# Patient Record
Sex: FEMALE | Race: WHITE | URBAN - METROPOLITAN AREA
[De-identification: names, ages, dates, MRNs, and addresses within clinical notes are randomized per-mention and may not be internally consistent; named-entity substitution may affect disease eponyms.]

---

## 2021-08-23 ENCOUNTER — EMERGENCY (EMERGENCY)
Facility: HOSPITAL | Age: 41
LOS: 1 days | Discharge: HOME | End: 2021-08-23
Payer: COMMERCIAL

## 2021-08-23 VITALS
DIASTOLIC BLOOD PRESSURE: 91 MMHG | SYSTOLIC BLOOD PRESSURE: 134 MMHG | TEMPERATURE: 98 F | HEART RATE: 77 BPM | RESPIRATION RATE: 18 BRPM | OXYGEN SATURATION: 100 %

## 2021-08-23 LAB — SARS-COV-2 RNA SPEC QL NAA+PROBE: SIGNIFICANT CHANGE UP

## 2021-08-23 PROCEDURE — 99282 EMERGENCY DEPT VISIT SF MDM: CPT

## 2021-08-23 PROCEDURE — U0003: CPT

## 2021-08-23 PROCEDURE — U0005: CPT

## 2021-08-23 PROCEDURE — 99283 EMERGENCY DEPT VISIT LOW MDM: CPT

## 2021-08-23 NOTE — ED STATDOCS - PATIENT PORTAL LINK FT
You can access the FollowMyHealth Patient Portal offered by [facility] by registering at the following website: [email or web address]. By joining [employer]’s FollowMyHealth portal, you will also be able to view your health information using other applications (apps) compatible with our system.

## 2021-08-23 NOTE — ED STATDOCS - NS ED ROS FT
General: no fever or chills  Eyes: no redness or discharge  ENT: no nasal congestion, no sore throat  Cardiac: no CP, no palpitations  Respiratory: No cough, SOB, PETERSEN, wheeze  Abd: no abd pain, N/V/D  Skin: no itch or rash

## 2021-09-11 ENCOUNTER — EMERGENCY (EMERGENCY)
Facility: HOSPITAL | Age: 41
LOS: 1 days | Discharge: HOME | End: 2021-09-11
Payer: COMMERCIAL

## 2021-09-11 VITALS
RESPIRATION RATE: 16 BRPM | TEMPERATURE: 98 F | OXYGEN SATURATION: 99 % | DIASTOLIC BLOOD PRESSURE: 86 MMHG | SYSTOLIC BLOOD PRESSURE: 135 MMHG | HEART RATE: 86 BPM

## 2021-09-11 LAB — SARS-COV-2 RNA SPEC QL NAA+PROBE: SIGNIFICANT CHANGE UP

## 2021-09-11 PROCEDURE — 99283 EMERGENCY DEPT VISIT LOW MDM: CPT

## 2021-09-11 PROCEDURE — 99282 EMERGENCY DEPT VISIT SF MDM: CPT

## 2021-09-11 PROCEDURE — U0005: CPT

## 2021-09-11 PROCEDURE — U0003: CPT

## 2021-09-11 NOTE — ED PROVIDER NOTE - OBJECTIVE STATEMENT
PT presents for COVID testing. Denies any symptoms. No known + COVID contact reported. Needs swab to visit at hospital

## 2021-09-11 NOTE — ED PROVIDER NOTE - CLINICAL SUMMARY MEDICAL DECISION MAKING FREE TEXT BOX
Pt presenting for COVID testing  -Pt does not meet current COVID-19 criteria listed in most updated guidelines as per [facility] protocol/algorithm for admission at this time   -Lengthy discussion with patient regarding home quarantine, follow up with PMD, anticipatory guidance provided and supportive care recommended. Advised immediate return if worsening symptoms, strict return precautions, especially if short of breath, chest pain, inability to tolerate food/liquid. Pt given pre-printed [facility] Novel Coronavirus (COVID19) information packet which contains instructions on time frame of result and how to obtain. Pt verbalized understanding and agreement of plan.

## 2021-09-11 NOTE — ED PROVIDER NOTE - PATIENT PORTAL LINK FT
You can access the FollowMyHealth Patient Portal offered by [facility] by registering at the following website: [email or web address]. By joining [employer]’s FollowMyHealth portal, you will also be able to view your health information using other applications (apps) compatible with our system.

## 2021-11-08 ENCOUNTER — EMERGENCY (EMERGENCY)
Facility: HOSPITAL | Age: 41
LOS: 1 days | Discharge: HOME | End: 2021-11-08
Attending: EMERGENCY MEDICINE | Admitting: EMERGENCY MEDICINE
Payer: COMMERCIAL

## 2021-11-08 VITALS
DIASTOLIC BLOOD PRESSURE: 114 MMHG | WEIGHT: 190.04 LBS | OXYGEN SATURATION: 97 % | TEMPERATURE: 99 F | SYSTOLIC BLOOD PRESSURE: 164 MMHG | HEIGHT: 65 IN | RESPIRATION RATE: 18 BRPM | HEART RATE: 85 BPM

## 2021-11-08 DIAGNOSIS — Z98.891 HISTORY OF UTERINE SCAR FROM PREVIOUS SURGERY: Chronic | ICD-10-CM

## 2021-11-08 LAB
BASOPHILS # BLD AUTO: 0.03 K/UL — SIGNIFICANT CHANGE UP (ref 0–0.2)
BASOPHILS NFR BLD AUTO: 0.3 % — SIGNIFICANT CHANGE UP (ref 0–2)
D DIMER BLD IA.RAPID-MCNC: 153 NG/ML DDU — SIGNIFICANT CHANGE UP
EOSINOPHIL # BLD AUTO: 0.1 K/UL — SIGNIFICANT CHANGE UP (ref 0–0.5)
EOSINOPHIL NFR BLD AUTO: 1 % — SIGNIFICANT CHANGE UP (ref 0–6)
HCG UR QL: NEGATIVE — SIGNIFICANT CHANGE UP
HCT VFR BLD CALC: 35.8 % — SIGNIFICANT CHANGE UP (ref 34.5–45)
HGB BLD-MCNC: 11.2 G/DL — LOW (ref 11.5–15.5)
IMM GRANULOCYTES NFR BLD AUTO: 0.2 % — SIGNIFICANT CHANGE UP (ref 0–1.5)
LYMPHOCYTES # BLD AUTO: 3.74 K/UL — HIGH (ref 1–3.3)
LYMPHOCYTES # BLD AUTO: 39 % — SIGNIFICANT CHANGE UP (ref 13–44)
MCHC RBC-ENTMCNC: 24.8 PG — LOW (ref 27–34)
MCHC RBC-ENTMCNC: 31.3 GM/DL — LOW (ref 32–36)
MCV RBC AUTO: 79.4 FL — LOW (ref 80–100)
MONOCYTES # BLD AUTO: 0.69 K/UL — SIGNIFICANT CHANGE UP (ref 0–0.9)
MONOCYTES NFR BLD AUTO: 7.2 % — SIGNIFICANT CHANGE UP (ref 2–14)
NEUTROPHILS # BLD AUTO: 5.01 K/UL — SIGNIFICANT CHANGE UP (ref 1.8–7.4)
NEUTROPHILS NFR BLD AUTO: 52.3 % — SIGNIFICANT CHANGE UP (ref 43–77)
NRBC # BLD: 0 /100 WBCS — SIGNIFICANT CHANGE UP (ref 0–0)
PLATELET # BLD AUTO: 270 K/UL — SIGNIFICANT CHANGE UP (ref 150–400)
RBC # BLD: 4.51 M/UL — SIGNIFICANT CHANGE UP (ref 3.8–5.2)
RBC # FLD: 16.4 % — HIGH (ref 10.3–14.5)
WBC # BLD: 9.59 K/UL — SIGNIFICANT CHANGE UP (ref 3.8–10.5)
WBC # FLD AUTO: 9.59 K/UL — SIGNIFICANT CHANGE UP (ref 3.8–10.5)

## 2021-11-08 PROCEDURE — 93010 ELECTROCARDIOGRAM REPORT: CPT

## 2021-11-08 PROCEDURE — 99285 EMERGENCY DEPT VISIT HI MDM: CPT

## 2021-11-08 RX ORDER — ASPIRIN/CALCIUM CARB/MAGNESIUM 324 MG
162 TABLET ORAL ONCE
Refills: 0 | Status: COMPLETED | OUTPATIENT
Start: 2021-11-08 | End: 2021-11-08

## 2021-11-08 RX ADMIN — Medication 162 MILLIGRAM(S): at 23:35

## 2021-11-08 NOTE — ED PROVIDER NOTE - NSFOLLOWUPINSTRUCTIONS_ED_ALL_ED_FT
Please call tomorrow to make an appointment with your primary care doctor as listed on your insurance card.      Hypertension    WHAT YOU NEED TO KNOW:    Hypertension is high blood pressure. Your blood pressure is the force of your blood moving against the walls of your arteries. Hypertension causes your blood pressure to get so high that your heart has to work much harder than normal. This can damage your heart. The cause of hypertension may not be known. This is called essential or primary hypertension. Hypertension caused by another medical condition, such as kidney disease, is called secondary hypertension.    DISCHARGE INSTRUCTIONS:    Call your local emergency number (911 in the ) or have someone call if:   •You have chest pain.      •You have any of the following signs of a heart attack: ?Squeezing, pressure, or pain in your chest      ?You may also have any of the following: ?Discomfort or pain in your back, neck, jaw, stomach, or arm      ?Shortness of breath      ?Nausea or vomiting      ?Lightheadedness or a sudden cold sweat        •You become confused or have trouble speaking.      •You suddenly feel lightheaded or have trouble breathing.      Return to the emergency department if:   •You have a severe headache or vision loss.      •You have weakness in an arm or leg.      Call your doctor or cardiologist if:   •You feel faint, dizzy, confused, or drowsy.      •You have been taking your blood pressure medicine but your pressure is higher than your provider says it should be.      •You have questions or concerns about your condition or care.      Medicines: You may need any of the following:  •Antihypertensives may be used to help lower your blood pressure. Several kinds of medicines are available. Your healthcare provider will choose medicines based on the kind of hypertension you have. You may need more than one type of medicine. Take the medicine exactly as directed.      •Diuretics help decrease extra fluid that collects in your body. This will help lower your blood pressure. You may urinate more often while you take this medicine.      •Cholesterol medicine helps lower your cholesterol level. A low cholesterol level helps prevent heart disease and makes it easier to control your blood pressure.      •Take your medicine as directed. Contact your healthcare provider if you think your medicine is not helping or if you have side effects. Tell him or her if you are allergic to any medicine. Keep a list of the medicines, vitamins, and herbs you take. Include the amounts, and when and why you take them. Bring the list or the pill bottles to follow-up visits. Carry your medicine list with you in case of an emergency.      Follow up with your doctor or cardiologist as directed: You will need to return to have your blood pressure checked and to have other lab tests done. Write down your questions so you remember to ask them during your visits.    Stages of hypertension:     Blood Pressure Readings     •Normal blood pressure is 119/79 or lower. Your healthcare provider may only check your blood pressure each year if it stays at a normal level.      •Elevated blood pressure is 120/79 to 129/79. This is sometimes called prehypertension. Your healthcare provider may suggest lifestyle changes to help lower your blood pressure to a normal level. He or she may then check it again in 3 to 6 months.      •Stage 1 hypertension is 130/80 to 139/89. Your provider may recommend lifestyle changes, medication, and checks every 3 to 6 months until your blood pressure is controlled.      •Stage 2 hypertension is 140/90 or higher. Your provider will recommend lifestyle changes and have you take 2 kinds of hypertension medicines. You will also need to have your blood pressure checked monthly until it is controlled.      Manage hypertension:   •Check your blood pressure at home. Avoid smoking, caffeine, and exercise at least 30 minutes before checking your blood pressure. Sit and rest for 5 minutes before you take your blood pressure. Extend your arm and support it on a flat surface. Your arm should be at the same level as your heart. Follow the directions that came with your blood pressure monitor. Check your blood pressure 2 times, 1 minute apart, before you take your medicine in the morning. Also check your blood pressure before your evening meal. Keep a record of your readings and bring it to your follow-up visits. Ask your healthcare provider what your blood pressure should be.  How to take a Blood Pressure           •Manage any other health conditions you have. Health conditions such as diabetes can increase your risk for hypertension. Follow your healthcare provider's instructions and take all your medicines as directed.      •Ask about all medicines. Certain medicines can increase your blood pressure. Examples include oral birth control pills, decongestants, herbal supplements, and NSAIDs, such as ibuprofen. Your healthcare provider can tell you which medicines are safe for you to take. This includes prescription and over-the-counter medicines.      Lifestyle changes you can make to manage hypertension: Your healthcare provider may recommend you work with a team to manage hypertension. The team may include medical experts such as a dietitian, an exercise or physical therapist, and a behavior therapist. Your family members may be included in helping you create lifestyle changes.    Ways to Lower Your Blood Pressure     •Limit sodium (salt) as directed. Too much sodium can affect your fluid balance. Check labels to find low-sodium or no-salt-added foods. Some low-sodium foods use potassium salts for flavor. Too much potassium can also cause health problems. Your healthcare provider will tell you how much sodium and potassium are safe for you to have in a day. He or she may recommend that you limit sodium to 2,300 mg a day.             •Follow the meal plan recommended by your healthcare provider. A dietitian or your provider can give you more information on low-sodium plans or the DASH (Dietary Approaches to Stop Hypertension) eating plan. The DASH plan is low in sodium, processed sugar, unhealthy fats, and total fat. It is high in potassium, calcium, and fiber. These can be found in vegetables, fruit, and whole-grain foods.             •Be physically active throughout the day. Physical activity, such as exercise, can help control your blood pressure and your weight. Be physically active for at least 30 minutes per day, on most days of the week. Include aerobic activity, such as walking or riding a bicycle. Also include strength training at least 2 times each week. Your healthcare providers can help you create a physical activity plan.  Ways to Be Physically Active       Strength Training for Adults           •Decrease stress. This may help lower your blood pressure. Learn ways to relax, such as deep breathing or listening to music.      •Limit alcohol as directed. Alcohol can increase your blood pressure. A drink of alcohol is 12 ounces of beer, 5 ounces of wine, or 1½ ounces of liquor.      •Do not smoke. Nicotine and other chemicals in cigarettes and cigars can increase your blood pressure and also cause lung damage. Ask your healthcare provider for information if you currently smoke and need help to quit. E-cigarettes or smokeless tobacco still contain nicotine. Talk to your healthcare provider before you use these products.  Prevent Heart Disease

## 2021-11-08 NOTE — ED PROVIDER NOTE - OBJECTIVE STATEMENT
41 y.o. F c/o HTN and not feeling well all day, this morning went to an urgent care, had a covid test so she could visit a relative in the hospital, covid was negative but pt was advised her bp was high (about 150/100), pt went to visit family in hospital, was there most of day, during day felt heart beating fast, felt off, no cp, no sob, was monitoring BP a few times and tonight was still high (about 160/100) so came for evaluation, does not currently see a pcp (lives in [facility] [place] [place], here visiting family, supposed to return in 3d, has a pcp listed on her insurance card she can call for f/u).    of note, previous [facility] encounters show [facility] visit 11/5 for covid test, BP was 150/98, on 9/11/21 seen at [facility] for covid test bp was 135/86, on 8/23/21 seen at [facility] ED for covid test bp was 134/91

## 2021-11-08 NOTE — ED PROVIDER NOTE - PRO INTERPRETER NEED 2
Michael Ontiveros 
33 y.o. female 1990 
[facility] [id number] 
[id number] [address]: Progress Note Encounter Date: 5/20/2019 Chief Complaint Patient presents with  Back Pain  
  has been moving boxes, pulled something in her back History provided by patient History of Present Illness Michael Ontiveros is a 33 y.o. female who presents to clinic today for: 
 
Back pain Patient present with cc of back pain for 1 week. Patient is in a new home and is still having to haul laundry over to in-laws. She has been taking  mg x 2 tabs for the pain. Patient is concerned that she needs to be seen before pain worsens. Describes pain as sharp and radiating to the left hip. Review of Systems Review of Systems Constitutional: Negative for chills and fever. Gastrointestinal: Negative for abdominal pain, constipation, diarrhea, nausea and vomiting. Genitourinary: Negative for dysuria, flank pain, frequency, hematuria and urgency. Musculoskeletal: Positive for back pain. Negative for falls, joint pain, myalgias and neck pain. Skin: Negative for itching and rash. Neurological: Negative for dizziness and headaches. Vitals/Objective:  
 
Vitals:  
 05/20/19 1403 BP: 129/82 Pulse: (!) 106 Resp: 16 Temp: 98.8 °F (37.1 °C) TempSrc: Oral  
SpO2: 98% Weight: 139 lb (63 kg) Height: 5' 5.5\" (1.664 m) Body mass index is 22.78 kg/m². Wt Readings from Last 3 Encounters:  
05/20/19 139 lb (63 kg) 01/09/19 141 lb (64 kg) 09/15/17 124 lb (56.2 kg) Physical Exam  
Constitutional: She is oriented to person, place, and time. She appears well-developed and well-nourished. Cardiovascular: Normal rate and regular rhythm. No murmur heard. Pulmonary/Chest: Effort normal and breath sounds normal.  
Neurological: She is alert and oriented to person, place, and time.   
MSK:  
 Posture: Normal 
 Deformity: None ROM:  
  Flexion: Normal 
  Extension: Normal  
  Lateral bending: Normal  
 
 Gait: Normal   
 
 Palpation: L1-L5: No tenderness Sacrum: No tenderness Coccyx: No tenderness Left Paraspinal: + tenderness (lower thoracic and lumbar) Right Paraspinal: No tenderness Strength (0-5/5) Hip Flexion:   Left: 5/5  Right: 5/5 Hip Extension:  Left: 5/5  Right: 5/5 Hip Abduction:  Left: 5/5  Right: 5/5 Hip Adduction:  Left: 5/5  Right: 5/5 Knee Extension:  Left: 5/5  Right: 5/5 Knee Flexion:   Left: 5/5  Right: 5/5 Ankle dorsiflexion:  Left: 5/5  Right: 5/5 Ankle plantarflexion:  Left: 5/5  Right: 5/5 Great toe extension:  Left: 5/5  Right: 5/5 Sensation: Intact, no deficits DTR: 
  Patella:  Left: +2  Right: +2 Achilles:  Left: +2  Right: +2 Special test: 
  Straight leg: Left: Negative  Right: Negative Kristyns: Left: Negative  Right: Negative Piriformis: Left: Negative  Right: Negative Recent Results (from the past 24 hour(s)) AMB POC URINALYSIS DIP STICK AUTO W/O MICRO Collection Time: 05/20/19  2:26 PM  
Result Value Ref Range Color (UA POC) Yellow Clarity (UA POC) Clear Glucose (UA POC) Negative Negative Bilirubin (UA POC) Negative Negative Ketones (UA POC) Negative Negative Specific gravity (UA POC) 1.020 1.001 - 1.035 Blood (UA POC) Negative Negative pH (UA POC) 6.0 4.6 - 8.0 Protein (UA POC) Negative Negative Urobilinogen (UA POC) 0.2 mg/dL 0.2 - 1 Nitrites (UA POC) Negative Negative Leukocyte esterase (UA POC) 1+ Negative AMB POC URINE PREGNANCY TEST, VISUAL COLOR COMPARISON Collection Time: 05/20/19  2:27 PM  
Result Value Ref Range VALID INTERNAL CONTROL POC Yes HCG urine, Ql. (POC) Negative Negative Assessment and Plan:  
 
Encounter Diagnoses ICD-10-CM ICD-9-CM 1. Acute bilateral low back pain without sciatica M54.5 724.2  
  338.19 1. Acute bilateral low back pain without sciatica Discussed with patient proper lifting techniques as well as exercises to strengthen back and core to reduce future injury. - AMB POC URINE PREGNANCY TEST, VISUAL COLOR COMPARISON 
- AMB POC URINALYSIS DIP STICK AUTO W/O MICRO 
- cyclobenzaprine (FLEXERIL) 5 mg tablet; Take 1 Tab by mouth two (2) times daily as needed for Muscle Spasm(s). Dispense: 20 Tab; Refill: 0 I have discussed the diagnosis with the patient and the intended plan as seen in the above orders. she has expressed understanding. The patient has received an after-visit summary and questions were answered concerning future plans. I have discussed medication side effects and warnings with the patient as well. Electronically Signed: Sasha Zuñiga MD 
  
History/Allergies Patients past medical, surgical and family histories were reviewed and updated. History reviewed. No pertinent past medical history. History reviewed. No pertinent surgical history. Family History Problem Relation Age of Onset  Cancer Maternal Aunt  Diabetes Maternal Grandmother  Diabetes Maternal Grandfather  Heart Disease Maternal Grandfather  Stroke Paternal Grandmother Social History Tobacco Use  Smoking status: Never Smoker  Smokeless tobacco: Never Used Substance Use Topics  Alcohol use: Yes Comment: social  
 Drug use: No  
  
 
 
Allergies Allergen Reactions  Bees [Hymenoptera Allergenic Extract] Swelling Disposition Follow-up and Dispositions  · Return if symptoms worsen or fail to improve. No future appointments. Current Medications after this visit Current Outpatient Medications Medication Sig  cyclobenzaprine (FLEXERIL) 5 mg tablet Take 1 Tab by mouth two (2) times daily as needed for Muscle Spasm(s).  norgestimate-ethinyl estradiol (SPRINTEC, 28,) 0.25-35 mg-mcg per tablet Take 1 Tab by mouth daily. No current facility-administered medications for this visit. Medications Discontinued During This Encounter Medication Reason  Brompheniram-PSE-Chlophedianol (ATUSS DA) 2-30-12.5 mg/5 mL liqd Not A Current Medication  raNITIdine hcl 150 mg capsule Not A Current Medication English

## 2021-11-08 NOTE — ED PROVIDER NOTE - PROGRESS NOTE DETAILS
discussed results and BP - currently 136/97 - has had multiple urgicare visits (asymptomatic for covid testing) where diastolic +/- systolic was elevated - will start low dose amlodipine, pt understands she must make an appt with pcp (has one on insurance card)

## 2021-11-08 NOTE — ED ADULT NURSE NOTE - OBJECTIVE STATEMENT
pt walked in c/o HTN, states she was not feeling good all day, went to [facility] for covid test to visit her brother in hospital and found out BP is high, pt reports BP stayed high all day , now feels like pressure in the head and hard to take deep breath.

## 2021-11-08 NOTE — ED PROVIDER NOTE - NSICDXPASTMEDICALHX_GEN_ALL_CORE_FT
Problem: Skin Integrity:  Goal: Will show no infection signs and symptoms  Description: Will show no infection signs and symptoms  8/3/2020 0241 by Armando Ferreira RN  Outcome: Ongoing  8/2/2020 1637 by Krishna Bhakta RN  Outcome: Ongoing  Goal: Absence of new skin breakdown  Description: Absence of new skin breakdown  8/3/2020 0241 by Armando Ferreira RN  Outcome: Ongoing  8/2/2020 1637 by Krishna Bhakta RN  Outcome: Ongoing PAST MEDICAL HISTORY:  No pertinent past medical history

## 2021-11-08 NOTE — ED PROVIDER NOTE - PATIENT PORTAL LINK FT
You can access the FollowMyHealth Patient Portal offered by [facility] by registering at the following website: [email or web address]. By joining [employer]’s FollowMyHealth portal, you will also be able to view your health information using other applications (apps) compatible with our system.

## 2021-11-08 NOTE — ED ADULT NURSE NOTE - GASTROINTESTINAL ASSESSMENT
PATIENT IS WANTING TO GET A REFILL ON HER MEDICATION FOR INFECTION AND USES [facility]    - - -

## 2021-11-09 VITALS
RESPIRATION RATE: 15 BRPM | OXYGEN SATURATION: 98 % | HEART RATE: 73 BPM | TEMPERATURE: 98 F | DIASTOLIC BLOOD PRESSURE: 92 MMHG | SYSTOLIC BLOOD PRESSURE: 137 MMHG

## 2021-11-09 LAB
ALBUMIN SERPL ELPH-MCNC: 3.8 G/DL — SIGNIFICANT CHANGE UP (ref 3.3–5)
ALP SERPL-CCNC: 45 U/L — SIGNIFICANT CHANGE UP (ref 30–120)
ALT FLD-CCNC: 22 U/L DA — SIGNIFICANT CHANGE UP (ref 10–60)
ANION GAP SERPL CALC-SCNC: 7 MMOL/L — SIGNIFICANT CHANGE UP (ref 5–17)
AST SERPL-CCNC: 14 U/L — SIGNIFICANT CHANGE UP (ref 10–40)
BILIRUB SERPL-MCNC: 0.3 MG/DL — SIGNIFICANT CHANGE UP (ref 0.2–1.2)
BUN SERPL-MCNC: 15 MG/DL — SIGNIFICANT CHANGE UP (ref 7–23)
CALCIUM SERPL-MCNC: 9 MG/DL — SIGNIFICANT CHANGE UP (ref 8.4–10.5)
CHLORIDE SERPL-SCNC: 101 MMOL/L — SIGNIFICANT CHANGE UP (ref 96–108)
CO2 SERPL-SCNC: 26 MMOL/L — SIGNIFICANT CHANGE UP (ref 22–31)
CREAT SERPL-MCNC: 0.63 MG/DL — SIGNIFICANT CHANGE UP (ref 0.5–1.3)
GLUCOSE SERPL-MCNC: 96 MG/DL — SIGNIFICANT CHANGE UP (ref 70–99)
MAGNESIUM SERPL-MCNC: 1.8 MG/DL — SIGNIFICANT CHANGE UP (ref 1.6–2.6)
POTASSIUM SERPL-MCNC: 3.9 MMOL/L — SIGNIFICANT CHANGE UP (ref 3.5–5.3)
POTASSIUM SERPL-SCNC: 3.9 MMOL/L — SIGNIFICANT CHANGE UP (ref 3.5–5.3)
PROT SERPL-MCNC: 7 G/DL — SIGNIFICANT CHANGE UP (ref 6–8.3)
SODIUM SERPL-SCNC: 134 MMOL/L — LOW (ref 135–145)
TROPONIN I, HIGH SENSITIVITY RESULT: 4.3 NG/L — SIGNIFICANT CHANGE UP

## 2021-11-09 PROCEDURE — 84484 ASSAY OF TROPONIN QUANT: CPT

## 2021-11-09 PROCEDURE — 71046 X-RAY EXAM CHEST 2 VIEWS: CPT | Mod: 26

## 2021-11-09 PROCEDURE — 80053 COMPREHEN METABOLIC PANEL: CPT

## 2021-11-09 PROCEDURE — 81025 URINE PREGNANCY TEST: CPT

## 2021-11-09 PROCEDURE — 85379 FIBRIN DEGRADATION QUANT: CPT

## 2021-11-09 PROCEDURE — 99284 EMERGENCY DEPT VISIT MOD MDM: CPT | Mod: 25

## 2021-11-09 PROCEDURE — 36415 COLL VENOUS BLD VENIPUNCTURE: CPT

## 2021-11-09 PROCEDURE — 71046 X-RAY EXAM CHEST 2 VIEWS: CPT

## 2021-11-09 PROCEDURE — 85025 COMPLETE CBC W/AUTO DIFF WBC: CPT

## 2021-11-09 PROCEDURE — 83735 ASSAY OF MAGNESIUM: CPT

## 2021-11-09 PROCEDURE — 93005 ELECTROCARDIOGRAM TRACING: CPT

## 2021-11-09 RX ORDER — AMLODIPINE BESYLATE 2.5 MG/1
1 TABLET ORAL
Qty: 14 | Refills: 0
Start: 2021-11-09 | End: 2021-11-22

## 2021-11-09 RX ORDER — AMLODIPINE BESYLATE 2.5 MG/1
2.5 TABLET ORAL ONCE
Refills: 0 | Status: COMPLETED | OUTPATIENT
Start: 2021-11-09 | End: 2021-11-09

## 2021-11-09 RX ADMIN — AMLODIPINE BESYLATE 2.5 MILLIGRAM(S): 2.5 TABLET ORAL at 01:00
